# Patient Record
Sex: FEMALE | Race: WHITE
[De-identification: names, ages, dates, MRNs, and addresses within clinical notes are randomized per-mention and may not be internally consistent; named-entity substitution may affect disease eponyms.]

---

## 2018-01-28 ENCOUNTER — HOSPITAL ENCOUNTER (EMERGENCY)
Dept: HOSPITAL 25 - UCCORT | Age: 23
Discharge: HOME | End: 2018-01-28
Payer: COMMERCIAL

## 2018-01-28 VITALS — SYSTOLIC BLOOD PRESSURE: 121 MMHG | DIASTOLIC BLOOD PRESSURE: 72 MMHG

## 2018-01-28 DIAGNOSIS — Z88.2: ICD-10-CM

## 2018-01-28 DIAGNOSIS — Z88.1: ICD-10-CM

## 2018-01-28 DIAGNOSIS — J06.9: Primary | ICD-10-CM

## 2018-01-28 PROCEDURE — G0463 HOSPITAL OUTPT CLINIC VISIT: HCPCS

## 2018-01-28 PROCEDURE — 99212 OFFICE O/P EST SF 10 MIN: CPT

## 2018-01-28 NOTE — UC
Respiratory Complaint HPI





- HPI Summary


HPI Summary: 





Cough, congestion, chills for about 5 days. No known fever. There is some pain 

with deep breaths. 





- History of Current Complaint


Stated Complaint: HEADACHE,COUGH


Time Seen by Provider: 01/28/18 09:47


Hx Obtained From: Patient


Hx Last Menstrual Period: NOW


Pregnant?: No


Onset/Duration: Gradual Onset, Lasting Days


Timing: Constant


Severity Initially: Moderate


Severity Currently: Moderate


Character: Cough: Nonproductive


Aggravating Factors: Deep Breaths, Recumbent Position


Alleviating Factors: Upright Position, Spontaneous Resolution


Associated Signs And Symptoms: Positive: Chills, URI, Nasal Congestion.  

Negative: Pleuritic Chest Pain, Dizziness, Calf Pain, Calf Swelling, Sinus 

Discomfort





- Allergies/Home Medications


Allergies/Adverse Reactions: 


 Allergies











Allergy/AdvReac Type Severity Reaction Status Date / Time


 


Cefaclor [From [facility]] Allergy Unknown Unknown Verified 05/15/15 18:21





   Reaction  





   Details  


 


Sulfa Antibiotics Allergy Unknown Unknown Verified 05/15/15 18:21





   Reaction  





   Details  














PMH/Surg Hx/FS Hx/Imm Hx


Previously Healthy: Yes





- Surgical History


Surgical History: None





- Family History


Known Family History: Positive: Other - No respiratory family history.





- Social History


Occupation: Employed Full-time


Alcohol Use: Occasionally


Substance Use Type: None


Smoking Status (MU): Never Smoked Tobacco





Review of Systems


Constitutional: Chills


ENT: Sinus Congestion


Respiratory: Cough


All Other Systems Reviewed And Are Negative: Yes





Physical Exam


Triage Information Reviewed: Yes


Appearance: Well-Appearing, No Pain Distress, Well-Nourished


Vital Signs Reviewed: Yes


Eyes: Positive: Conjunctiva Clear


ENT: Positive: Normal ENT inspection, Pharyngeal erythema, Nasal congestion, 

TMs normal, Uvula midline.  Negative: TM bulging, TM dull, TM red, Tonsillar 

swelling, Tonsillar exudate, Trismus, Sinus tenderness


Neck: Positive: Supple, Nontender, No Lymphadenopathy


Respiratory: Positive: Lungs clear, Normal breath sounds, No respiratory 

distress, No accessory muscle use.  Negative: Respiratory distress, Decreased 

breath sounds, Accessory muscle use, Crackles, Rhonchi, Stridor, Wheezing, 

Expiration


Cardiovascular: Positive: No Murmur, Pulses Normal, Brisk Capillary Refill


Abdomen Description: Positive: No Organomegaly, Soft.  Negative: Distended, 

Guarding


Musculoskeletal: Positive: Strength Intact, ROM Intact, No Edema


Neurological: Positive: Alert, Muscle Tone Normal.  Negative: Fatigued


Psychological: Positive: Age Appropriate Behavior


Skin: Negative: rashes





Respiratory Course/Dx





- Course


Course Of Treatment: supporitve care including decongestants and cough 

suppressants. No signs of bacterial infection or pneumonia clinically.





- Differential Dx/Diagnosis


Provider Diagnoses: uri





Discharge





- Discharge Plan


Condition: Good


Disposition: HOME


Prescriptions: 


Benzonatate CAP* [Tessalon 100 MG CAP*] 100 mg PO TID PRN #30 cap


 PRN Reason: Cough


Patient Education Materials:  Upper Respiratory Infection (ED)


Referrals: 


Desire Jaeger [Primary Care Provider] - 


Additional Instructions: 


decongestants and cough suppressant.